# Patient Record
Sex: MALE | Race: ASIAN | NOT HISPANIC OR LATINO | URBAN - METROPOLITAN AREA
[De-identification: names, ages, dates, MRNs, and addresses within clinical notes are randomized per-mention and may not be internally consistent; named-entity substitution may affect disease eponyms.]

---

## 2020-07-25 ENCOUNTER — TELEPHONE ENCOUNTER (OUTPATIENT)
Dept: URBAN - METROPOLITAN AREA CLINIC 13 | Facility: CLINIC | Age: 35
End: 2020-07-25

## 2020-07-25 RX ORDER — BISMUTH SUBCITRATE POTASSIUM, METRONIDAZOLE, TETRACYCLINE HYDROCHLORIDE 140; 125; 125 MG/1; MG/1; MG/1
TAKE THREE CAPSULES BY MOUTH FOUR TIMES DAILY CAPSULE ORAL
Qty: 120 | Refills: 0 | OUTPATIENT
Start: 2019-08-23 | End: 2019-08-26

## 2020-07-26 ENCOUNTER — TELEPHONE ENCOUNTER (OUTPATIENT)
Dept: URBAN - METROPOLITAN AREA CLINIC 13 | Facility: CLINIC | Age: 35
End: 2020-07-26

## 2020-07-26 RX ORDER — ACETAMINOPHEN AND CODEINE PHOSPHATE 300; 30 MG/1; MG/1
TABLET ORAL
Qty: 20 | Refills: 0 | Status: ACTIVE | COMMUNITY
Start: 2018-05-03

## 2020-07-26 RX ORDER — DOXYCYCLINE 100 MG/1
TAKE 1 CAPSULE TWICE DAILY WITH MEALS CAPSULE ORAL
Qty: 20 | Refills: 0 | Status: ACTIVE | COMMUNITY
Start: 2019-08-23

## 2020-07-26 RX ORDER — AZITHROMYCIN DIHYDRATE 250 MG/1
TABLET, FILM COATED ORAL
Qty: 6 | Refills: 0 | Status: ACTIVE | COMMUNITY
Start: 2019-03-22

## 2020-07-26 RX ORDER — OMEPRAZOLE 40 MG/1
CAPSULE, DELAYED RELEASE ORAL
Qty: 30 | Refills: 0 | Status: ACTIVE | COMMUNITY
Start: 2019-07-17

## 2020-07-26 RX ORDER — PREDNISONE 10 MG/1
TABLET ORAL
Qty: 21 | Refills: 0 | Status: ACTIVE | COMMUNITY
Start: 2019-03-22

## 2020-07-26 RX ORDER — DICYCLOMINE HYDROCHLORIDE 10 MG/1
TAKE 1 CAPSULE EVERY 6 HOURS PRN ABDOMINAL PAIN CAPSULE ORAL
Qty: 30 | Refills: 1 | Status: ACTIVE | COMMUNITY
Start: 2019-07-17

## 2020-07-26 RX ORDER — OMEPRAZOLE 20 MG/1
TAKE 1 CAPSULE DAILY CAPSULE, DELAYED RELEASE ORAL
Qty: 1 | Refills: 1 | Status: ACTIVE | COMMUNITY
Start: 2019-08-20

## 2020-07-26 RX ORDER — PREDNISONE 5 MG/1
TABLET ORAL
Qty: 18 | Refills: 0 | Status: ACTIVE | COMMUNITY
Start: 2018-11-19

## 2020-07-26 RX ORDER — BISMUTH SUBCITRATE POTASSIUM, METRONIDAZOLE, TETRACYCLINE HYDROCHLORIDE 140; 125; 125 MG/1; MG/1; MG/1
TAKE THREE CAPSULES BY MOUTH FOUR TIMES DAILY CAPSULE ORAL
Qty: 120 | Refills: 0 | Status: ACTIVE | COMMUNITY
Start: 2019-08-26

## 2020-07-26 RX ORDER — LOTEPREDNOL ETABONATE AND TOBRAMYCIN 5; 3 MG/ML; MG/ML
SUSPENSION/ DROPS OPHTHALMIC
Qty: 5 | Refills: 0 | Status: ACTIVE | COMMUNITY
Start: 2019-03-11

## 2020-07-26 RX ORDER — TIZANIDINE 4 MG/1
TABLET ORAL
Qty: 30 | Refills: 0 | Status: ACTIVE | COMMUNITY
Start: 2018-11-19

## 2020-07-26 RX ORDER — METRONIDAZOLE 250 MG/1
TAKE 1 TABLET 4 TIMES DAILY 1 TAB 4 TIMES PER DAY FOR 10 DAYS TABLET ORAL
Qty: 40 | Refills: 0 | Status: ACTIVE | COMMUNITY
Start: 2019-08-23

## 2021-11-18 ENCOUNTER — OFFICE VISIT (OUTPATIENT)
Dept: URBAN - METROPOLITAN AREA CLINIC 113 | Facility: CLINIC | Age: 36
End: 2021-11-18

## 2025-01-07 ENCOUNTER — LAB OUTSIDE AN ENCOUNTER (OUTPATIENT)
Dept: URBAN - METROPOLITAN AREA CLINIC 113 | Facility: CLINIC | Age: 40
End: 2025-01-07

## 2025-01-07 ENCOUNTER — OFFICE VISIT (OUTPATIENT)
Dept: URBAN - METROPOLITAN AREA CLINIC 113 | Facility: CLINIC | Age: 40
End: 2025-01-07
Payer: COMMERCIAL

## 2025-01-07 VITALS
TEMPERATURE: 98.1 F | RESPIRATION RATE: 18 BRPM | HEART RATE: 93 BPM | DIASTOLIC BLOOD PRESSURE: 75 MMHG | WEIGHT: 205.8 LBS | HEIGHT: 71 IN | BODY MASS INDEX: 28.81 KG/M2 | SYSTOLIC BLOOD PRESSURE: 127 MMHG

## 2025-01-07 DIAGNOSIS — K21.9 GERD: ICD-10-CM

## 2025-01-07 PROCEDURE — 99203 OFFICE O/P NEW LOW 30 MIN: CPT | Performed by: INTERNAL MEDICINE

## 2025-01-07 NOTE — HPI-TODAY'S VISIT:
Very pleasant 39-year-old  presents with a history of dyspepsia and altered bowel habits  He has been having issues with pyrosis, heartburn, and regurgitation. He has dyspepsia. Uneasy epigastric feeling. No NSAIDs. He tried omeprazole without much benefit. No dysphagia. No complaints of nausea or vomiting. Weight is stable. Appetite is fine. No reports of hematemesis. He has periodic abdominal discomfort. Right lower quadrant. This has been better over the past couple of weeks though. He had a CT scan which was negative. Previously he had slightly loose stools. He mentioned borborygmi. Now he is having slightly firmer stools. He is worried about the changes in bowel habits.  There is no significant family history of colon polyps or colon cancer  Occupation.  at [employer].  November 2024.  Hemoglobin 15.6, MCV 85, platelet count 259K, creatinine 1.0, AST 50, ALT 73, alkaline phos a 38.  Lipase 59.  EGD.  August 2019.  Z-line was normal.  Mild nonerosive gastritis.  Normal duodenum.  Biopsy was notable for chronic Helicobacter pylori gastritis.  He was treated with Pylera.  CT scan abdomen pelvis.  August 2019.  Normal  Abdominal ultrasound with elastography.  2019.  Stable hepatic steatosis.  No evidence for fibrosis on shear wave elastography  Abdominal ultrasound.  2017.  Hepatic steatosis.  CT scan abdomen pelvis at [facility].  Around December 2024.  Normal

## 2025-01-23 ENCOUNTER — OFFICE VISIT (OUTPATIENT)
Dept: URBAN - METROPOLITAN AREA SURGERY CENTER 25 | Facility: SURGERY CENTER | Age: 40
End: 2025-01-23

## 2025-01-28 ENCOUNTER — LAB OUTSIDE AN ENCOUNTER (OUTPATIENT)
Dept: URBAN - METROPOLITAN AREA CLINIC 113 | Facility: CLINIC | Age: 40
End: 2025-01-28

## 2025-01-28 ENCOUNTER — DASHBOARD ENCOUNTERS (OUTPATIENT)
Age: 40
End: 2025-01-28

## 2025-01-28 ENCOUNTER — CLAIMS CREATED FROM THE CLAIM WINDOW (OUTPATIENT)
Dept: URBAN - METROPOLITAN AREA CLINIC 4 | Facility: CLINIC | Age: 40
End: 2025-01-28
Payer: COMMERCIAL

## 2025-01-28 ENCOUNTER — OFFICE VISIT (OUTPATIENT)
Dept: URBAN - METROPOLITAN AREA SURGERY CENTER 25 | Facility: SURGERY CENTER | Age: 40
End: 2025-01-28

## 2025-01-28 ENCOUNTER — TELEPHONE ENCOUNTER (OUTPATIENT)
Dept: URBAN - METROPOLITAN AREA CLINIC 113 | Facility: CLINIC | Age: 40
End: 2025-01-28

## 2025-01-28 DIAGNOSIS — K29.70 GASTRITIS, UNSPECIFIED, WITHOUT BLEEDING: ICD-10-CM

## 2025-01-28 DIAGNOSIS — K21.9 GASTRO-ESOPHAGEAL REFLUX DISEASE WITHOUT ESOPHAGITIS: ICD-10-CM

## 2025-01-28 PROCEDURE — 88305 TISSUE EXAM BY PATHOLOGIST: CPT | Performed by: PATHOLOGY

## 2025-01-28 PROCEDURE — 88312 SPECIAL STAINS GROUP 1: CPT | Performed by: PATHOLOGY

## 2025-01-28 RX ORDER — PANTOPRAZOLE SODIUM 40 MG/1
1 TABLET TABLET, DELAYED RELEASE ORAL TWICE DAILY
Qty: 60 | Refills: 11 | OUTPATIENT
Start: 2025-01-28

## 2025-02-18 ENCOUNTER — OFFICE VISIT (OUTPATIENT)
Dept: URBAN - METROPOLITAN AREA CLINIC 113 | Facility: CLINIC | Age: 40
End: 2025-02-18
Payer: COMMERCIAL

## 2025-02-18 VITALS
HEART RATE: 81 BPM | TEMPERATURE: 98.1 F | HEIGHT: 71 IN | BODY MASS INDEX: 28.84 KG/M2 | RESPIRATION RATE: 18 BRPM | DIASTOLIC BLOOD PRESSURE: 79 MMHG | WEIGHT: 206 LBS | SYSTOLIC BLOOD PRESSURE: 125 MMHG

## 2025-02-18 DIAGNOSIS — K76.0 HEPATIC STEATOSIS: ICD-10-CM

## 2025-02-18 DIAGNOSIS — K21.9 GERD: ICD-10-CM

## 2025-02-18 PROCEDURE — 99214 OFFICE O/P EST MOD 30 MIN: CPT | Performed by: INTERNAL MEDICINE

## 2025-02-18 RX ORDER — PANTOPRAZOLE SODIUM 40 MG/1
1 TABLET TABLET, DELAYED RELEASE ORAL TWICE DAILY
Qty: 60 | Refills: 11 | Status: ACTIVE | COMMUNITY
Start: 2025-01-28

## 2025-02-18 NOTE — HPI-TODAY'S VISIT:
Very pleasant 39-year-old  presents with a history of dyspepsia.  Doing better on pantoprazole. No dysphagia. No complaints of nausea or vomiting. Some dyspepsia. Weight is stable. Appetite is fine. No reports of hematemesis. Post prandial fullness noted.  There is no significant family history of colon polyps or colon cancer   Occupation.  at [employer]

## 2025-02-18 NOTE — HPI-TODAY'S VISIT:
EGD.  January 2025.  LA grade C reflux esophagitis limited to the GE junction.  Mildly granular mucosa notable at the GE junction.  Transient distal esophageal rings notable in the mid and distal esophagus.  Normal antrum and body.  Biopsied.  Biopsies were notable for reactive gastropathy and reflux type changes without evidence for eosinophilic esophagitis.  November 2024. CT scan abdomen pelvis with contrast. [facility]. Liver was mildly enlarged. Hepatic steatosis. Otherwise normal.  Labs.  November 2024.  Hemoglobin 15.6, platelet count 259K, creatinine 1.0, total bilirubin 0.9, AST 50, ALT 73.  Alkaline phosphatase 38.  EGD.  2019.  Z-line was normal.  Mild gastritis.  Duodenum was normal.  Biopsies were notable for Helicobacter.  He was treated with metronidazole, doxycycline, Pepto-Bismol.  CT scan abdomen pelvis.  2019.  Normal  Abdominal ultrasound with elastography.  2019.  Hepatic steatosis without evidence for stiffness on elastography. Ultrasound with elastography.  2017.  Hepatic steatosis.  No evidence for fibrosis on elastography.  Labs.  2016.  Anti-smooth muscle antibody negative, antimitochondrial antibody negative, alpha-1 antitrypsin normal, hepatitis serologies negative.

## 2025-04-10 ENCOUNTER — OFFICE VISIT (OUTPATIENT)
Dept: URBAN - METROPOLITAN AREA CLINIC 113 | Facility: CLINIC | Age: 40
End: 2025-04-10
Payer: COMMERCIAL

## 2025-04-10 DIAGNOSIS — K76.0 HEPATIC STEATOSIS: ICD-10-CM

## 2025-04-10 DIAGNOSIS — R74.8 ELEVATED LIVER ENZYMES: ICD-10-CM

## 2025-04-10 DIAGNOSIS — R12 HEARTBURN: ICD-10-CM

## 2025-04-10 DIAGNOSIS — K21.9 GERD: ICD-10-CM

## 2025-04-10 PROCEDURE — 99214 OFFICE O/P EST MOD 30 MIN: CPT | Performed by: INTERNAL MEDICINE

## 2025-04-10 RX ORDER — FAMOTIDINE 40 MG/1
1 TABLET AT BEDTIME TABLET, FILM COATED ORAL ONCE A DAY
Qty: 90 TABLET | Refills: 3 | OUTPATIENT
Start: 2025-04-10

## 2025-04-10 RX ORDER — PANTOPRAZOLE SODIUM 40 MG/1
1 TABLET TABLET, DELAYED RELEASE ORAL TWICE DAILY
Qty: 60 | Refills: 11 | Status: ACTIVE | COMMUNITY
Start: 2025-01-28

## 2025-04-10 NOTE — HPI-TODAY'S VISIT:
Very pleasant 39-year-old  presents with a history of dyspepsia.  Unfortunately, he has continued to have issues with dyspepsia. He has postprandial fullness. This some degree of early satiety. Not having heartburn. Stop taking the pantoprazole because of symptoms. Symptoms persisted. Regular bowel movements. Weight is stable. No dysphagia. Some nausea. Looking for alternative therapies.  There is no significant family history of colon polyps or colon cancer   Occupation.  at [employer]

## 2025-04-10 NOTE — HPI-TODAY'S VISIT:
Labs. April 2025. Lipase 68, ferritin 112, iron level 112, triglycerides 69, creatinine 1.0, total bili 0.8, ALT 57, AST 36, alkaline phosphatase 37, hemoglobin A1c 6.1%, hemoglobin 14.6, MCV 85, platelet count 271K  Right upper quadrant ultrasound with elastography. February 2025. Echogenic liver. Common bile duct 4 mm in size. Elastography shear wave velocity of 1.04. This is consistent with minimal evidence for fibrosis. Normal. Hepatomegaly and hepatic steatosis was noted.  EGD.  January 2025.  LA grade C reflux esophagitis limited to the GE junction.  Mildly granular mucosa notable at the GE junction.  Transient distal esophageal rings notable in the mid and distal esophagus.  Normal antrum and body.  Biopsied.  Biopsies were notable for reactive gastropathy and reflux type changes without evidence for eosinophilic esophagitis.  November 2024. CT scan abdomen pelvis with contrast. [facility]. Liver was mildly enlarged. Hepatic steatosis. Otherwise normal.  Labs.  November 2024.  Hemoglobin 15.6, platelet count 259K, creatinine 1.0, total bilirubin 0.9, AST 50, ALT 73.  Alkaline phosphatase 38.  Labs. September 2024. Hemoglobin A1c 6.1%, triglycerides 100, creatinine 1.1, ALT 54, AST 37, alkaline phosphatase 38, total bilirubin 0.5, hemoglobin 15.1, platelet count 257K  EGD.  2019.  Z-line was normal.  Mild gastritis.  Duodenum was normal.  Biopsies were notable for Helicobacter.  He was treated with metronidazole, doxycycline, Pepto-Bismol.  CT scan abdomen pelvis.  2019.  Normal  Abdominal ultrasound with elastography.  2019.  Hepatic steatosis without evidence for stiffness on elastography. Ultrasound with elastography.  2017.  Hepatic steatosis.  No evidence for fibrosis on elastography.  Labs.  2016.  Anti-smooth muscle antibody negative, antimitochondrial antibody negative, alpha-1 antitrypsin normal, hepatitis serologies negative.

## 2025-04-30 ENCOUNTER — LAB OUTSIDE AN ENCOUNTER (OUTPATIENT)
Dept: URBAN - METROPOLITAN AREA CLINIC 107 | Facility: CLINIC | Age: 40
End: 2025-04-30

## 2025-04-30 ENCOUNTER — OFFICE VISIT (OUTPATIENT)
Dept: URBAN - METROPOLITAN AREA CLINIC 107 | Facility: CLINIC | Age: 40
End: 2025-04-30

## 2025-04-30 RX ORDER — PANTOPRAZOLE SODIUM 40 MG/1
1 TABLET TABLET, DELAYED RELEASE ORAL TWICE DAILY
Qty: 60 | Refills: 11 | Status: ON HOLD | COMMUNITY
Start: 2025-01-28

## 2025-04-30 RX ORDER — FAMOTIDINE 40 MG/1
1 TABLET AT BEDTIME TABLET, FILM COATED ORAL ONCE A DAY
Qty: 90 TABLET | Refills: 3 | Status: ON HOLD | COMMUNITY
Start: 2025-04-10

## 2025-04-30 NOTE — HPI-OTHER HISTORIES
Labs. April 2025. Lipase 68, ferritin 112, iron level 112, triglycerides 69, creatinine 1.0, total bili 0.8, ALT 57, AST 36, alkaline phosphatase 37, hemoglobin A1c 6.1%, hemoglobin 14.6, MCV 85, platelet count 271K  Right upper quadrant ultrasound with elastography. February 2025. Echogenic liver. Common bile duct 4 mm in size. Elastography shear wave velocity of 1.04. This is consistent with minimal evidence for fibrosis. Normal. Hepatomegaly and hepatic steatosis was noted.  EGD. January 2025. LA grade C reflux esophagitis limited to the GE junction. Mildly granular mucosa notable at the GE junction. Transient distal esophageal rings notable in the mid and distal esophagus. Normal antrum and body. Biopsied. Biopsies were notable for reactive gastropathy and reflux type changes without evidence for eosinophilic esophagitis.  November 2024. CT scan abdomen pelvis with contrast. [facility]. Liver was mildly enlarged. Hepatic steatosis. Otherwise normal.  Labs. November 2024. Hemoglobin 15.6, platelet count 259K, creatinine 1.0, total bilirubin 0.9, AST 50, ALT 73. Alkaline phosphatase 38.  Labs. September 2024. Hemoglobin A1c 6.1%, triglycerides 100, creatinine 1.1, ALT 54, AST 37, alkaline phosphatase 38, total bilirubin 0.5, hemoglobin 15.1, platelet count 257K  EGD. 2019. Z-line was normal. Mild gastritis. Duodenum was normal. Biopsies were notable for Helicobacter. He was treated with metronidazole, doxycycline, Pepto-Bismol.  CT scan abdomen pelvis. 2019. Normal  Abdominal ultrasound with elastography. 2019. Hepatic steatosis without evidence for stiffness on elastography. Ultrasound with elastography. 2017. Hepatic steatosis. No evidence for fibrosis on elastography.  Labs. 2016. Anti-smooth muscle antibody negative, antimitochondrial antibody negative, alpha-1 antitrypsin normal, hepatitis serologies negative.

## 2025-04-30 NOTE — HPI-TODAY'S VISIT:
He was just seen in the office on 4/10 by Dr. Perez for follow-up of reflux and dyspepsia, refractory to daily PPI.  It was felt there may be a component of diabetic gastroparesis.  He was encouraged dietary modification and tight glycemic control.  He was recommended a trial of famotidine, with consideration for Voquezna.  A gastric emptying study was considered.

## 2025-05-14 ENCOUNTER — OFFICE VISIT (OUTPATIENT)
Dept: URBAN - METROPOLITAN AREA MEDICAL CENTER 19 | Facility: MEDICAL CENTER | Age: 40
End: 2025-05-14

## 2025-06-09 ENCOUNTER — OFFICE VISIT (OUTPATIENT)
Dept: URBAN - METROPOLITAN AREA CLINIC 113 | Facility: CLINIC | Age: 40
End: 2025-06-09

## 2025-06-27 ENCOUNTER — OFFICE VISIT (OUTPATIENT)
Dept: URBAN - METROPOLITAN AREA SURGERY CENTER 25 | Facility: SURGERY CENTER | Age: 40
End: 2025-06-27

## 2025-06-27 RX ORDER — PANTOPRAZOLE SODIUM 40 MG/1
1 TABLET TABLET, DELAYED RELEASE ORAL TWICE DAILY
Qty: 60 | Refills: 11 | Status: ON HOLD | COMMUNITY
Start: 2025-01-28

## 2025-06-27 RX ORDER — FAMOTIDINE 40 MG/1
1 TABLET AT BEDTIME TABLET, FILM COATED ORAL ONCE A DAY
Qty: 90 TABLET | Refills: 3 | Status: ON HOLD | COMMUNITY
Start: 2025-04-10

## 2025-07-10 ENCOUNTER — OFFICE VISIT (OUTPATIENT)
Dept: URBAN - METROPOLITAN AREA CLINIC 113 | Facility: CLINIC | Age: 40
End: 2025-07-10
Payer: COMMERCIAL

## 2025-07-10 DIAGNOSIS — K76.0 HEPATIC STEATOSIS: ICD-10-CM

## 2025-07-10 DIAGNOSIS — R74.8 ELEVATED LIVER ENZYMES: ICD-10-CM

## 2025-07-10 DIAGNOSIS — K21.9 GERD: ICD-10-CM

## 2025-07-10 DIAGNOSIS — R12 HEARTBURN: ICD-10-CM

## 2025-07-10 DIAGNOSIS — K59.00 CONSTIPATION, UNSPECIFIED CONSTIPATION TYPE: ICD-10-CM

## 2025-07-10 PROCEDURE — 99214 OFFICE O/P EST MOD 30 MIN: CPT | Performed by: INTERNAL MEDICINE

## 2025-07-10 RX ORDER — FAMOTIDINE 40 MG/1
1 TABLET AT BEDTIME TABLET, FILM COATED ORAL ONCE A DAY
Qty: 90 TABLET | Refills: 3 | Status: ON HOLD | COMMUNITY
Start: 2025-04-10

## 2025-07-10 RX ORDER — PANTOPRAZOLE SODIUM 40 MG/1
1 TABLET TABLET, DELAYED RELEASE ORAL TWICE DAILY
Qty: 60 | Refills: 11 | Status: ON HOLD | COMMUNITY
Start: 2025-01-28

## 2025-07-10 NOTE — HPI-TODAY'S VISIT:
Colonoscopy.  June 27, 2025.  2 distinct 6 mm as 8 mm sessile transverse colon polyp removed by cold snare.  5 mm sessile descending colon polyp, cold snare.  Normal colonic mucosa otherwise.  Random biopsies were obtained.  The polyps were adenomatous.  Random biopsies were negative for colitis.  Repeat colonoscopy recommended in 3 years.  Gastric emptying study.  May 29, 2025.  Normal.  Labs.  July 8, 2025.  TSH 1.8, a.m. cortisol 18, antitissue transglutaminase less than 1, antigliadin antibody negative.

## 2025-07-10 NOTE — HPI-OTHER HISTORIES
Labs. April 2025. Lipase 68, ferritin 112, iron level 112, triglycerides 69, creatinine 1.0, total bili 0.8, ALT 57, AST 36, alkaline phosphatase 37, hemoglobin A1c 6.1%, hemoglobin 14.6, MCV 85, platelet count 271K  Right upper quadrant ultrasound with elastography. February 2025. Echogenic liver. Common bile duct 4 mm in size. Elastography shear wave velocity of 1.04. This is consistent with minimal evidence for fibrosis. Normal. Hepatomegaly and hepatic steatosis was noted.  EGD. January 2025. LA grade C reflux esophagitis limited to the GE junction. Mildly granular mucosa notable at the GE junction. Transient distal esophageal rings notable in the mid and distal esophagus. Normal antrum and body. Biopsied. Biopsies were notable for reactive gastropathy and reflux type changes without evidence for eosinophilic esophagitis.  November 2024. CT scan abdomen pelvis with contrast. [facility]. Liver was mildly enlarged. Hepatic steatosis. Otherwise normal.  Labs. November 2024. Hemoglobin 15.6, platelet count 259K, creatinine 1.0, total bilirubin 0.9, AST 50, ALT 73. Alkaline phosphatase 38.  Labs. September 2024. Hemoglobin A1c 6.1%, triglycerides 100, creatinine 1.1, ALT 54, AST 37, alkaline phosphatase 38, total bilirubin 0.5, hemoglobin 15.1, platelet count 257K  EGD. 2019. Z-line was normal. Mild gastritis. Duodenum was normal. Biopsies were notable for Helicobacter. He was treated with metronidazole, doxycycline, Pepto-Bismol.  CT scan abdomen pelvis. 2019. Normal  Abdominal ultrasound with elastography. 2019. Hepatic steatosis without evidence for stiffness on elastography. Ultrasound with elastography. 2017. Hepatic steatosis. No evidence for fibrosis on elastography.  Labs. 2016. Anti-smooth muscle antibody negative, antimitochondrial antibody negative, alpha-1 antitrypsin normal, hepatitis serologies negative.

## 2025-07-22 ENCOUNTER — OFFICE VISIT (OUTPATIENT)
Dept: URBAN - METROPOLITAN AREA CLINIC 113 | Facility: CLINIC | Age: 40
End: 2025-07-22